# Patient Record
Sex: FEMALE | Race: BLACK OR AFRICAN AMERICAN | NOT HISPANIC OR LATINO | Employment: OTHER | URBAN - METROPOLITAN AREA
[De-identification: names, ages, dates, MRNs, and addresses within clinical notes are randomized per-mention and may not be internally consistent; named-entity substitution may affect disease eponyms.]

---

## 2023-02-18 RX ORDER — DILTIAZEM HYDROCHLORIDE 240 MG/1
CAPSULE, COATED, EXTENDED RELEASE ORAL
COMMUNITY
Start: 2022-05-24

## 2023-02-18 RX ORDER — FERROUS SULFATE 325(65) MG
TABLET ORAL
COMMUNITY

## 2023-02-18 RX ORDER — ATORVASTATIN CALCIUM 40 MG/1
TABLET, FILM COATED ORAL
COMMUNITY

## 2023-02-18 RX ORDER — POTASSIUM CHLORIDE 1.5 G/1.58G
20 POWDER, FOR SOLUTION ORAL ONCE
COMMUNITY
End: 2024-03-19

## 2023-02-18 RX ORDER — VIT B COMP NO.3/FOLIC/C/BIOTIN 1 MG-60 MG
1 TABLET ORAL
COMMUNITY

## 2023-02-18 RX ORDER — HYDRALAZINE HYDROCHLORIDE 100 MG/1
TABLET, FILM COATED ORAL
COMMUNITY
Start: 2021-11-09 | End: 2024-03-19

## 2023-02-18 RX ORDER — CARVEDILOL 25 MG/1
TABLET ORAL
COMMUNITY

## 2023-02-18 RX ORDER — ASPIRIN 81 MG/1
81 TABLET ORAL
COMMUNITY

## 2023-02-18 RX ORDER — ISOSORBIDE MONONITRATE 60 MG/1
TABLET, EXTENDED RELEASE ORAL
COMMUNITY

## 2023-02-22 NOTE — DISCHARGE INSTRUCTIONS
"Patient Education       Arteriovenous Fistula for Dialysis Discharge Instructions     About this topic   Patients with kidney failure are treated with dialysis. A special machine that acts like a kidney is used to wash harmful wastes from your blood. "Cleaned" blood then flows back into your body. This machine is hooked up to a vein in your body.  Doctors need access to your blood to do dialysis. One way for them to do this is by sewing an artery and a vein together to make a fistula. The medical name for this is an arteriovenous or AV fistula. Since it may take a few months for the fistula to heal and get stronger, surgery will be needed weeks to months before using this for dialysis.     What care is needed at home?   Do not wear anything tight on the fistula arm like watches, tight sleeves, or bracelets.  Take extra care not to hit your fistula arm on doorways, furniture, or other heavy, solid objects.  Do not carry anything heavy on the fistula arm like purses, bags, or children.  Do not let anyone except a dialysis nurse draw blood from your fistula arm.  Do not let anyone start an I.V. on your fistula arm.  Do not let anyone take a blood pressure on your fistula arm.  Wear your sling until the block wears off tomorrow.  Keep surgical extremity elevated.    What follow-up care is needed?   Be sure to keep your follow-up appointment.  You will be closely watched by healthcare staff during your treatments. Your fistula will be checked at each visit before dialysis is started.  A dialysis nurse will check for:  Signs of infection including warmth or redness of the skin, swelling of the arm with the fistula, drainage or open sores on the fistula arm  Blood flow ? The dialysis nurse will feel your arm for the vibration or "thrill". This happens as blood flows through the fistula. The nurse will use a stethoscope to listen to the sound of the blood going through the fistula. This is called the bruit.    What problems " "could happen?   Infection  Blood clot that limits blood flow. If blood clots happen often, you may need a new fistula or the clots may have to be removed.  Drainage lasts longer than 48 hours  Coldness and pain in the hand or arm (needs care right away)  Numbness in the fingers  Swelling or bulge at the access site  Fistula does not work right  Fistula may fail to develop or mature  Bleeding    When do I need to call the doctor?   Signs of infection. These include a fever of 100.4°F (38°C) or higher, chills.  Signs of wound infection. These include swelling, redness, warmth around the wound; too much pain when touched; yellowish, greenish, or bloody discharge; foul smell coming from the cut site; cut site opens up.  Pain at the site  Coldness or pain in the hand or arm  Bleeding from your fistula that does not stop after 20 minutes of gentle pressure  You do not feel the vibration or "thrill" as blood flows through the fistula   "

## 2023-02-27 ENCOUNTER — ANESTHESIA (OUTPATIENT)
Dept: SURGERY | Facility: HOSPITAL | Age: 68
End: 2023-02-27
Payer: MEDICARE

## 2023-02-27 ENCOUNTER — ANESTHESIA EVENT (OUTPATIENT)
Dept: SURGERY | Facility: HOSPITAL | Age: 68
End: 2023-02-27
Payer: MEDICARE

## 2023-02-27 ENCOUNTER — HOSPITAL ENCOUNTER (OUTPATIENT)
Facility: HOSPITAL | Age: 68
Discharge: HOME | End: 2023-02-27
Attending: SPECIALIST | Admitting: SPECIALIST
Payer: MEDICARE

## 2023-02-27 DIAGNOSIS — N18.6 END STAGE RENAL DISEASE: Primary | ICD-10-CM

## 2023-02-27 LAB
ANION GAP SERPL CALC-SCNC: 13 MMOL/L (ref 8–16)
BUN SERPL-MCNC: 53 MG/DL (ref 6–30)
CHLORIDE SERPL-SCNC: 103 MMOL/L (ref 95–110)
CREAT SERPL-MCNC: 9.6 MG/DL (ref 0.5–1.4)
GLUCOSE SERPL-MCNC: 91 MG/DL (ref 70–110)
HCT VFR BLD CALC: 29 %PCV (ref 36–54)
HGB BLD-MCNC: 10 G/DL
POC IONIZED CALCIUM: 1.1 MMOL/L (ref 1.06–1.42)
POC TCO2 (MEASURED): 28 MMOL/L (ref 23–29)
POTASSIUM BLD-SCNC: 4 MMOL/L (ref 3.5–5.1)
SAMPLE: ABNORMAL
SODIUM BLD-SCNC: 139 MMOL/L (ref 136–145)

## 2023-02-27 PROCEDURE — 25000003 PHARM REV CODE 250: Performed by: NURSE ANESTHETIST, CERTIFIED REGISTERED

## 2023-02-27 PROCEDURE — 25000003 PHARM REV CODE 250: Performed by: SPECIALIST

## 2023-02-27 PROCEDURE — 63600175 PHARM REV CODE 636 W HCPCS: Performed by: SPECIALIST

## 2023-02-27 PROCEDURE — 25000003 PHARM REV CODE 250: Performed by: ANESTHESIOLOGY

## 2023-02-27 PROCEDURE — C1894 INTRO/SHEATH, NON-LASER: HCPCS | Performed by: SPECIALIST

## 2023-02-27 PROCEDURE — 36000706: Performed by: SPECIALIST

## 2023-02-27 PROCEDURE — 37000008 HC ANESTHESIA 1ST 15 MINUTES: Performed by: SPECIALIST

## 2023-02-27 PROCEDURE — 63600175 PHARM REV CODE 636 W HCPCS: Performed by: ANESTHESIOLOGY

## 2023-02-27 PROCEDURE — 37000009 HC ANESTHESIA EA ADD 15 MINS: Performed by: SPECIALIST

## 2023-02-27 PROCEDURE — 71000015 HC POSTOP RECOV 1ST HR: Performed by: SPECIALIST

## 2023-02-27 PROCEDURE — 36000707: Performed by: SPECIALIST

## 2023-02-27 PROCEDURE — 76942 ECHO GUIDE FOR BIOPSY: CPT | Performed by: ANESTHESIOLOGY

## 2023-02-27 PROCEDURE — 63600175 PHARM REV CODE 636 W HCPCS: Performed by: NURSE ANESTHETIST, CERTIFIED REGISTERED

## 2023-02-27 PROCEDURE — 63600175 PHARM REV CODE 636 W HCPCS

## 2023-02-27 RX ORDER — PROTAMINE SULFATE 10 MG/ML
INJECTION, SOLUTION INTRAVENOUS
Status: DISCONTINUED | OUTPATIENT
Start: 2023-02-27 | End: 2023-02-27

## 2023-02-27 RX ORDER — LIDOCAINE HYDROCHLORIDE 10 MG/ML
INJECTION, SOLUTION EPIDURAL; INFILTRATION; INTRACAUDAL; PERINEURAL
Status: DISCONTINUED | OUTPATIENT
Start: 2023-02-27 | End: 2023-02-27

## 2023-02-27 RX ORDER — DIPHENHYDRAMINE HYDROCHLORIDE 50 MG/ML
25 INJECTION INTRAMUSCULAR; INTRAVENOUS EVERY 6 HOURS PRN
Status: CANCELLED | OUTPATIENT
Start: 2023-02-27

## 2023-02-27 RX ORDER — FENTANYL CITRATE 50 UG/ML
INJECTION, SOLUTION INTRAMUSCULAR; INTRAVENOUS
Status: DISCONTINUED | OUTPATIENT
Start: 2023-02-27 | End: 2023-02-27

## 2023-02-27 RX ORDER — MEPERIDINE HYDROCHLORIDE 25 MG/ML
12.5 INJECTION INTRAMUSCULAR; INTRAVENOUS; SUBCUTANEOUS ONCE
Status: CANCELLED | OUTPATIENT
Start: 2023-02-27 | End: 2023-02-27

## 2023-02-27 RX ORDER — BUPIVACAINE HYDROCHLORIDE 5 MG/ML
INJECTION, SOLUTION EPIDURAL; INTRACAUDAL
Status: DISCONTINUED | OUTPATIENT
Start: 2023-02-27 | End: 2023-02-27 | Stop reason: HOSPADM

## 2023-02-27 RX ORDER — ONDANSETRON 2 MG/ML
4 INJECTION INTRAMUSCULAR; INTRAVENOUS ONCE
Status: CANCELLED | OUTPATIENT
Start: 2023-02-27 | End: 2023-02-27

## 2023-02-27 RX ORDER — CEFAZOLIN SODIUM 1 G/3ML
INJECTION, POWDER, FOR SOLUTION INTRAMUSCULAR; INTRAVENOUS
Status: DISCONTINUED | OUTPATIENT
Start: 2023-02-27 | End: 2023-02-27 | Stop reason: HOSPADM

## 2023-02-27 RX ORDER — IPRATROPIUM BROMIDE AND ALBUTEROL SULFATE 2.5; .5 MG/3ML; MG/3ML
3 SOLUTION RESPIRATORY (INHALATION) ONCE AS NEEDED
Status: CANCELLED | OUTPATIENT
Start: 2023-02-27 | End: 2034-07-26

## 2023-02-27 RX ORDER — FAMOTIDINE 10 MG/ML
20 INJECTION INTRAVENOUS ONCE
Status: COMPLETED | OUTPATIENT
Start: 2023-02-27 | End: 2023-02-27

## 2023-02-27 RX ORDER — HEPARIN SODIUM 5000 [USP'U]/ML
INJECTION, SOLUTION INTRAVENOUS; SUBCUTANEOUS
Status: DISCONTINUED
Start: 2023-02-27 | End: 2023-02-27 | Stop reason: HOSPADM

## 2023-02-27 RX ORDER — HYDROMORPHONE HYDROCHLORIDE 2 MG/ML
0.2 INJECTION, SOLUTION INTRAMUSCULAR; INTRAVENOUS; SUBCUTANEOUS EVERY 5 MIN PRN
Status: CANCELLED | OUTPATIENT
Start: 2023-02-27

## 2023-02-27 RX ORDER — METOCLOPRAMIDE HYDROCHLORIDE 5 MG/ML
10 INJECTION INTRAMUSCULAR; INTRAVENOUS EVERY 10 MIN PRN
Status: CANCELLED | OUTPATIENT
Start: 2023-02-27

## 2023-02-27 RX ORDER — METOCLOPRAMIDE HYDROCHLORIDE 5 MG/ML
10 INJECTION INTRAMUSCULAR; INTRAVENOUS ONCE
Status: COMPLETED | OUTPATIENT
Start: 2023-02-27 | End: 2023-02-27

## 2023-02-27 RX ORDER — PROTAMINE SULFATE 10 MG/ML
INJECTION, SOLUTION INTRAVENOUS
Status: DISCONTINUED
Start: 2023-02-27 | End: 2023-02-27 | Stop reason: HOSPADM

## 2023-02-27 RX ORDER — HYDROCODONE BITARTRATE AND ACETAMINOPHEN 5; 325 MG/1; MG/1
1 TABLET ORAL EVERY 4 HOURS PRN
Status: DISCONTINUED | OUTPATIENT
Start: 2023-02-27 | End: 2023-02-27 | Stop reason: HOSPADM

## 2023-02-27 RX ORDER — LIDOCAINE HYDROCHLORIDE 10 MG/ML
INJECTION INFILTRATION; PERINEURAL
Status: DISCONTINUED
Start: 2023-02-27 | End: 2023-02-27 | Stop reason: HOSPADM

## 2023-02-27 RX ORDER — ROPIVACAINE HYDROCHLORIDE 5 MG/ML
INJECTION, SOLUTION EPIDURAL; INFILTRATION; PERINEURAL
Status: COMPLETED
Start: 2023-02-27 | End: 2023-02-27

## 2023-02-27 RX ORDER — SODIUM CHLORIDE 9 MG/ML
120 INJECTION, SOLUTION INTRAVENOUS CONTINUOUS
Status: DISCONTINUED | OUTPATIENT
Start: 2023-02-27 | End: 2023-02-27 | Stop reason: HOSPADM

## 2023-02-27 RX ORDER — ROPIVACAINE HYDROCHLORIDE 5 MG/ML
INJECTION, SOLUTION EPIDURAL; INFILTRATION; PERINEURAL
Status: COMPLETED | OUTPATIENT
Start: 2023-02-27 | End: 2023-02-27

## 2023-02-27 RX ORDER — MIDAZOLAM HYDROCHLORIDE 1 MG/ML
2 INJECTION INTRAMUSCULAR; INTRAVENOUS ONCE AS NEEDED
Status: COMPLETED | OUTPATIENT
Start: 2023-02-27 | End: 2023-02-27

## 2023-02-27 RX ORDER — SODIUM CHLORIDE 9 MG/ML
INJECTION, SOLUTION INTRAVENOUS CONTINUOUS
Status: DISCONTINUED | OUTPATIENT
Start: 2023-02-27 | End: 2023-02-27 | Stop reason: HOSPADM

## 2023-02-27 RX ORDER — BUPIVACAINE HYDROCHLORIDE 5 MG/ML
INJECTION, SOLUTION EPIDURAL; INTRACAUDAL
Status: DISCONTINUED
Start: 2023-02-27 | End: 2023-02-27 | Stop reason: HOSPADM

## 2023-02-27 RX ORDER — MIDAZOLAM HYDROCHLORIDE 1 MG/ML
INJECTION INTRAMUSCULAR; INTRAVENOUS
Status: COMPLETED
Start: 2023-02-27 | End: 2023-02-27

## 2023-02-27 RX ORDER — ONDANSETRON 2 MG/ML
INJECTION INTRAMUSCULAR; INTRAVENOUS
Status: DISCONTINUED | OUTPATIENT
Start: 2023-02-27 | End: 2023-02-27

## 2023-02-27 RX ORDER — LIDOCAINE HYDROCHLORIDE 10 MG/ML
INJECTION, SOLUTION EPIDURAL; INFILTRATION; INTRACAUDAL; PERINEURAL
Status: DISCONTINUED | OUTPATIENT
Start: 2023-02-27 | End: 2023-02-27 | Stop reason: HOSPADM

## 2023-02-27 RX ORDER — HEPARIN SODIUM 1000 [USP'U]/ML
INJECTION, SOLUTION INTRAVENOUS; SUBCUTANEOUS
Status: DISCONTINUED | OUTPATIENT
Start: 2023-02-27 | End: 2023-02-27

## 2023-02-27 RX ORDER — SODIUM CHLORIDE 9 MG/ML
INJECTION, SOLUTION INTRAMUSCULAR; INTRAVENOUS; SUBCUTANEOUS
Status: DISCONTINUED
Start: 2023-02-27 | End: 2023-02-27 | Stop reason: HOSPADM

## 2023-02-27 RX ORDER — LIDOCAINE HYDROCHLORIDE 10 MG/ML
1 INJECTION, SOLUTION EPIDURAL; INFILTRATION; INTRACAUDAL; PERINEURAL ONCE
Status: DISCONTINUED | OUTPATIENT
Start: 2023-02-27 | End: 2023-02-27 | Stop reason: HOSPADM

## 2023-02-27 RX ORDER — CEFAZOLIN SODIUM 1 G/3ML
2 INJECTION, POWDER, FOR SOLUTION INTRAMUSCULAR; INTRAVENOUS
Status: COMPLETED | OUTPATIENT
Start: 2023-02-27 | End: 2023-02-27

## 2023-02-27 RX ORDER — PROPOFOL 10 MG/ML
VIAL (ML) INTRAVENOUS
Status: DISCONTINUED | OUTPATIENT
Start: 2023-02-27 | End: 2023-02-27

## 2023-02-27 RX ORDER — PROPOFOL 10 MG/ML
VIAL (ML) INTRAVENOUS CONTINUOUS PRN
Status: DISCONTINUED | OUTPATIENT
Start: 2023-02-27 | End: 2023-02-27

## 2023-02-27 RX ORDER — HEPARIN SODIUM 5000 [USP'U]/ML
INJECTION, SOLUTION INTRAVENOUS; SUBCUTANEOUS
Status: DISCONTINUED | OUTPATIENT
Start: 2023-02-27 | End: 2023-02-27 | Stop reason: HOSPADM

## 2023-02-27 RX ORDER — CEFAZOLIN 2 G/1
INJECTION, POWDER, FOR SOLUTION INTRAMUSCULAR; INTRAVENOUS
Status: COMPLETED
Start: 2023-02-27 | End: 2023-02-27

## 2023-02-27 RX ORDER — CEFAZOLIN SODIUM 1 G/3ML
INJECTION, POWDER, FOR SOLUTION INTRAMUSCULAR; INTRAVENOUS
Status: DISCONTINUED
Start: 2023-02-27 | End: 2023-02-27 | Stop reason: HOSPADM

## 2023-02-27 RX ORDER — ACETAMINOPHEN 500 MG
1000 TABLET ORAL ONCE
Status: COMPLETED | OUTPATIENT
Start: 2023-02-27 | End: 2023-02-27

## 2023-02-27 RX ORDER — PROTAMINE SULFATE 10 MG/ML
INJECTION, SOLUTION INTRAVENOUS
Status: DISCONTINUED | OUTPATIENT
Start: 2023-02-27 | End: 2023-02-27 | Stop reason: HOSPADM

## 2023-02-27 RX ADMIN — SODIUM CHLORIDE: 9 INJECTION, SOLUTION INTRAVENOUS at 10:02

## 2023-02-27 RX ADMIN — HEPARIN SODIUM 4000 UNITS: 1000 INJECTION, SOLUTION INTRAVENOUS; SUBCUTANEOUS at 01:02

## 2023-02-27 RX ADMIN — PROTAMINE SULFATE 25 MG: 10 INJECTION, SOLUTION INTRAVENOUS at 01:02

## 2023-02-27 RX ADMIN — PROPOFOL 30 MG: 10 INJECTION, EMULSION INTRAVENOUS at 12:02

## 2023-02-27 RX ADMIN — ROPIVACAINE HYDROCHLORIDE 30 ML: 5 INJECTION, SOLUTION EPIDURAL; INFILTRATION; PERINEURAL at 11:02

## 2023-02-27 RX ADMIN — LIDOCAINE HYDROCHLORIDE 50 MG: 10 INJECTION, SOLUTION EPIDURAL; INFILTRATION; INTRACAUDAL; PERINEURAL at 12:02

## 2023-02-27 RX ADMIN — PROPOFOL 50 MCG/KG/MIN: 10 INJECTION, EMULSION INTRAVENOUS at 12:02

## 2023-02-27 RX ADMIN — METOCLOPRAMIDE 10 MG: 5 INJECTION, SOLUTION INTRAMUSCULAR; INTRAVENOUS at 10:02

## 2023-02-27 RX ADMIN — FENTANYL CITRATE 50 MCG: 50 INJECTION, SOLUTION INTRAMUSCULAR; INTRAVENOUS at 12:02

## 2023-02-27 RX ADMIN — ONDANSETRON 4 MG: 2 INJECTION INTRAMUSCULAR; INTRAVENOUS at 01:02

## 2023-02-27 RX ADMIN — FAMOTIDINE 20 MG: 10 INJECTION, SOLUTION INTRAVENOUS at 10:02

## 2023-02-27 RX ADMIN — MIDAZOLAM HYDROCHLORIDE 2 MG: 1 INJECTION INTRAMUSCULAR; INTRAVENOUS at 11:02

## 2023-02-27 RX ADMIN — MIDAZOLAM HYDROCHLORIDE 2 MG: 1 INJECTION, SOLUTION INTRAMUSCULAR; INTRAVENOUS at 11:02

## 2023-02-27 RX ADMIN — CEFAZOLIN 2 G: 330 INJECTION, POWDER, FOR SOLUTION INTRAMUSCULAR; INTRAVENOUS at 12:02

## 2023-02-27 RX ADMIN — ACETAMINOPHEN 1000 MG: 500 TABLET ORAL at 10:02

## 2023-02-27 NOTE — ANESTHESIA POSTPROCEDURE EVALUATION
Anesthesia Post Evaluation    Patient: Jeane Pelaez    Procedure(s) Performed: Procedure(s) (LRB):  CREATION, AV FISTULA Left basiclic transposition / supra clavicular block (Left)    Final Anesthesia Type: MAC      Patient location during evaluation: PACU  Patient participation: Yes- Able to Participate  Level of consciousness: lethargic  Post-procedure vital signs: reviewed and stable  Pain management: adequate  Airway patency: patent    PONV status at discharge: No PONV  Anesthetic complications: no      Cardiovascular status: blood pressure returned to baseline and stable  Respiratory status: unassisted  Hydration status: euvolemic  Follow-up not needed.              No case tracking events are documented in the log.      Pain/Trey Score: Pain Rating Prior to Med Admin: 0 (2/27/2023 10:24 AM)

## 2023-02-27 NOTE — DISCHARGE SUMMARY
[facility] Surgical - Periop Services  Discharge Note  Short Stay    Procedure(s) (LRB):  CREATION, AV FISTULA Left basiclic transposition / supra clavicular block (Left)      OUTCOME: Patient tolerated treatment/procedure well without complication and is now ready for discharge.    DISPOSITION: Home or Self Care    FINAL DIAGNOSIS:  End stage renal disease    FOLLOWUP: In clinic    DISCHARGE INSTRUCTIONS:    Discharge Procedure Orders   Diet general     Keep surgical extremity elevated     Wound care routine (specify)   Order Comments: Leave dermabond in place until it falls off;  Ok to wash with soap under running water but do not submerge.  Do not use antibiotics ointment.     Call MD for:  temperature >100.4     Call MD for:  redness, tenderness, or signs of infection (pain, swelling, redness, odor or green/yellow discharge around incision site)     Activity as tolerated        TIME SPENT ON DISCHARGE: 5 minutes

## 2023-02-27 NOTE — OP NOTE
Surgeon: Cheko Jean Baptiste MD    Date: 02/27/2023     Diagnosis: Pre-Op Diagnosis Codes:     * End stage renal disease [N18.6]     Procedure:  Left Basilic fistula creation    History of Presenting Illness: Ms. Pelaez is a 67 y.o. year old female who has end stage renal disease and needs long term hemodialysis access.      Procedure:  The patient was taken to the operating room and placed in a supine position.  The Left arm was prepped and draped in the usual sterile fashion.  Antibiotics were administered and appropriate time-out was performed.  B-mode ultrasound was performed on the extremity.  The upper arm basilic vein was identified and the brachial artery was identified.  Both appeared appropriate for fistula creation.  Incision was made over the course of the basilic vein.  Dissection was performed through subcutaneous tissues down to the level of the vessel.  Antecubital brachial nerve was identified and kept free from harm.  Basilic vein was mobilized throughout our upper arm incision and multiple side branches were ligated with 3-0 silk ties and surgical clips.  The distal brachial artery was identified and controlled with vessel loops.  We ligated the basilic vein distally with a 2-0 silk suture.  The vein easily accepted a 4 dilator. The vein was then pressurized and tested.  We heparinized the patient and tunneling was performed.  The vein was brought through this tunnel and then we performed an end-to-side anastomosis between the brachial artery and basilic vein.  This was performed with a running 6 0 Prolene suture.  Flow was restored through the artery into the fistula.  There was a thrill in the fistula and a palpable radial pulse.  Protamine was administered and hemostasis was obtained.  Further dissection was performed to avoid tension on the fistula.  The wound was closed with 3-0 Vicryl suture and 4-0 Monocryl suture.  Dermabond was applied.    Complications:  none    Findings: Reasonable vein  size.  Good thrill to fistula.  Palpable radial pulse.

## 2023-02-27 NOTE — ANESTHESIA PROCEDURE NOTES
Supraclavicular Block    Patient location during procedure: pre-op   Block not for primary anesthetic.  Reason for block: at surgeon's request and post-op pain management   Post-op Pain Location: Left Arm   Start time: 2/27/2023 11:03 AM  Timeout: 2/27/2023 11:03 AM   End time: 2/27/2023 11:08 AM    Staffing  Authorizing Provider: James Mcconnell DO  Performing Provider: James Mcconnell DO    Preanesthetic Checklist  Completed: patient identified, IV checked, site marked, risks and benefits discussed, surgical consent, monitors and equipment checked, pre-op evaluation and timeout performed  Peripheral Block  Patient position: supine  Prep: ChloraPrep  Patient monitoring: heart rate, cardiac monitor, continuous pulse ox and frequent blood pressure checks  Block type: supraclavicular  Laterality: left  Injection technique: single shot  Needle  Needle type: Stimuplex   Needle gauge: 22 G  Needle length: 4 in  Needle localization: anatomical landmarks, nerve stimulator and ultrasound guidance   -ultrasound image captured on disc.  Assessment  Injection assessment: negative aspiration, negative parasthesia and local visualized surrounding nerve  Heart rate change: no  Slow fractionated injection: yes  Pain Tolerance: comfortable throughout block and no complaints  Medications:    Medications: ropivacaine (NAROPIN) injection 0.5% - Perineural   30 mL - 2/27/2023 11:08:00 AM    Additional Notes  VSS, patient tolerated procedure will.

## 2023-02-28 VITALS
BODY MASS INDEX: 24.84 KG/M2 | HEIGHT: 64 IN | OXYGEN SATURATION: 100 % | SYSTOLIC BLOOD PRESSURE: 146 MMHG | TEMPERATURE: 98 F | HEART RATE: 55 BPM | DIASTOLIC BLOOD PRESSURE: 90 MMHG | RESPIRATION RATE: 16 BRPM | WEIGHT: 145.5 LBS

## 2023-04-19 RX ORDER — HYDROCODONE BITARTRATE AND ACETAMINOPHEN 7.5; 325 MG/1; MG/1
TABLET ORAL
COMMUNITY
End: 2024-03-19

## 2023-05-01 NOTE — PROGRESS NOTES
[facility] Vascular - Clinic Note  Cheko Jean Baptiste MD      Patient Name: Jeane Pelaez     MRN: [id number]   Visit Date: 05/02/2023    Patient Care Team:  HELEN Vidales as PCP - General (Family Medicine)    Subjective:           Hemodialysis Access      HPI: Ms. Pelaez presents to the clinic for 6 week follow up s/p left basilic fistula creation 2/27/23. She denies signs or symptoms of infection to her left upper arm. She denies left hand numbness or pain since surgery.     She is currently on peritoneal dialysis.       Past Medical History:   Diagnosis Date    Heart palpitations     Hypertension     Mixed hyperlipidemia     Renal disorder      Past Surgical History:   Procedure Laterality Date    AV FISTULA PLACEMENT Left 2/27/2023    Procedure: CREATION, AV FISTULA Left basiclic transposition / supra clavicular block;  Surgeon: Cheko Jean Baptiste MD;  Location: [facility];  Service: Peripheral Vascular;  Laterality: Left;    CARDIAC SURGERY      ablation    HYSTERECTOMY      OOPHORECTOMY      PERITONEAL CATHETER INSERTION Right     right knee scope Right      Family History   Problem Relation Age of Onset    Hypertension Mother     Kidney disease Mother     Kidney disease Maternal Grandmother     Parkinsonism Maternal Grandmother      Social History     Socioeconomic History    Marital status:    Tobacco Use    Smoking status: Never    Smokeless tobacco: Never   Substance and Sexual Activity    Alcohol use: Not Currently    Drug use: Never     Current Outpatient Medications   Medication Instructions    aspirin (ECOTRIN) 81 mg, Oral    atorvastatin (LIPITOR) 40 MG tablet atorvastatin 40 mg tablet    carvediloL (COREG) 25 MG tablet carvedilol 25 mg tablet    diltiaZEM (CARDIZEM CD) 240 MG 24 hr capsule 1 capsule    epoetin siena-epbx (RETACRIT) 20,000 unit/mL injection Retacrit 20,000 unit/mL injection solution   INJECT 1 VIAL UNDER THE SKIN EVERY 2 WEEKS    ferrous sulfate (FEOSOL) 325 mg (65 mg iron)  "Tab tablet ferrous sulfate 325 mg (65 mg iron) tablet   TAKE ONE Tablet BY MOUTH ONCE DAILY    hydrALAZINE (APRESOLINE) 100 MG tablet hydralazine 100 mg tablet   TAKE ONE Tablet BY MOUTH THREE TIMES DAILY WITH FOOD    HYDROcodone-acetaminophen (NORCO) 7.5-325 mg per tablet hydrocodone 7.5 mg-acetaminophen 325 mg tablet   Take 1 tablet every 4 hours by oral route as needed.    isosorbide mononitrate (IMDUR) 60 MG 24 hr tablet isosorbide mononitrate ER 60 mg tablet,extended release 24 hr    potassium chloride (KLOR-CON) 20 mEq Pack 20 mEq, Oral, Once, Taking every other day    vit b cmplx 3-fa-vit c-biotin 1- mg-mg-mcg (AFUA-KEREN RX) 1- mg-mg-mcg Tab 1 tablet     Review of patient's allergies indicates:   Allergen Reactions    Codeine Nausea And Vomiting, Nausea Only and Other (See Comments)    Fentanyl      Fentanyl and Morphine post-op caused convulsions    Morphine Other (See Comments)     "Fentanyl and Morphine post-op caused convulsions"      Amoxicillin Other (See Comments)    Milk      Sinus problems           REVIEW OF SYSTEMS:  Review of Systems   All other systems reviewed and are negative.  12 point review of systems conducted, negative except as stated in the history of present illness. See HPI for details.      Objective:     PHYSICAL EXAM:   Visit Vitals  BP (!) 154/86 (BP Location: Right arm)   Pulse 79   Ht 5' 4" (1.626 m)   Wt 65.8 kg (145 lb)   BMI 24.89 kg/m²       Vascular Physical Exam  Vitals and nursing note reviewed.   Constitutional:       General: She is not in acute distress.  HENT:      Head: Normocephalic.      Nose: Nose normal.   Cardiovascular:      Rate and Rhythm: Normal rate and regular rhythm.      Pulses:           Radial pulses are 2+ on the left side.   Abdominal:      General: There is no distension.      Tenderness: There is no abdominal tenderness.   Musculoskeletal:      Right lower leg: No edema.      Left lower leg: No edema.   Lymphadenopathy:      Cervical: No " cervical adenopathy.   Neurological:      General: No focal deficit present.      Mental Status: She is alert.      Cranial Nerves: No cranial nerve deficit.   Psychiatric:         Mood and Affect: Mood normal.   Arteriovenous access:     Left arteriovenous access is present.        Left Access: Surgical AVF location(s): upper arm basilic vein. Upper arm basilic vein has thrill. Character of thrill is without pulsatility.      Post Operative Incision:    Location: left upper arm   Inspection: well approximated and healing well       Imaging Obtained/Reviewed:   Study: sonsosite imaging  Date: 5/2/23        Assessment/Plan:     Ms. Pelaez is a 67 y.o. with with end stage renal disease (ESRD) on dialysis who is s/p left brachiobasilic fistula creation on 2/27/23. On exam 2/27/23. Sonosite imaging obtained today shows fistula is maturing well with slightly small diameters through midportion (about 5mm). The access would benefit from continued time to achieve maturity. Follow up in 3-4 months to continue surveillance.     Ok to use fistula in two weeks.     1. ESRD (end stage renal disease)         No follow-ups on file. In addition to their scheduled follow up, the patient has also been instructed to follow up on as needed basis.     No future appointments.

## 2023-05-02 ENCOUNTER — OFFICE VISIT (OUTPATIENT)
Dept: VASCULAR SURGERY | Facility: CLINIC | Age: 68
End: 2023-05-02
Payer: MEDICARE

## 2023-05-02 VITALS
HEIGHT: 64 IN | WEIGHT: 145 LBS | DIASTOLIC BLOOD PRESSURE: 86 MMHG | SYSTOLIC BLOOD PRESSURE: 154 MMHG | BODY MASS INDEX: 24.75 KG/M2 | HEART RATE: 79 BPM

## 2023-05-02 DIAGNOSIS — N18.6 ESRD (END STAGE RENAL DISEASE): Primary | ICD-10-CM

## 2023-05-02 PROCEDURE — 3288F FALL RISK ASSESSMENT DOCD: CPT | Mod: CPTII,,, | Performed by: SPECIALIST

## 2023-05-02 PROCEDURE — 3288F PR FALLS RISK ASSESSMENT DOCUMENTED: ICD-10-PCS | Mod: CPTII,,, | Performed by: SPECIALIST

## 2023-05-02 PROCEDURE — 3077F PR MOST RECENT SYSTOLIC BLOOD PRESSURE >= 140 MM HG: ICD-10-PCS | Mod: CPTII,,, | Performed by: SPECIALIST

## 2023-05-02 PROCEDURE — 1160F RVW MEDS BY RX/DR IN RCRD: CPT | Mod: CPTII,,, | Performed by: SPECIALIST

## 2023-05-02 PROCEDURE — 3077F SYST BP >= 140 MM HG: CPT | Mod: CPTII,,, | Performed by: SPECIALIST

## 2023-05-02 PROCEDURE — 1159F MED LIST DOCD IN RCRD: CPT | Mod: CPTII,,, | Performed by: SPECIALIST

## 2023-05-02 PROCEDURE — 3008F BODY MASS INDEX DOCD: CPT | Mod: CPTII,,, | Performed by: SPECIALIST

## 2023-05-02 PROCEDURE — 1160F PR REVIEW ALL MEDS BY PRESCRIBER/CLIN PHARMACIST DOCUMENTED: ICD-10-PCS | Mod: CPTII,,, | Performed by: SPECIALIST

## 2023-05-02 PROCEDURE — 1101F PR PT FALLS ASSESS DOC 0-1 FALLS W/OUT INJ PAST YR: ICD-10-PCS | Mod: CPTII,,, | Performed by: SPECIALIST

## 2023-05-02 PROCEDURE — 3008F PR BODY MASS INDEX (BMI) DOCUMENTED: ICD-10-PCS | Mod: CPTII,,, | Performed by: SPECIALIST

## 2023-05-02 PROCEDURE — 3079F DIAST BP 80-89 MM HG: CPT | Mod: CPTII,,, | Performed by: SPECIALIST

## 2023-05-02 PROCEDURE — 3079F PR MOST RECENT DIASTOLIC BLOOD PRESSURE 80-89 MM HG: ICD-10-PCS | Mod: CPTII,,, | Performed by: SPECIALIST

## 2023-05-02 PROCEDURE — 1126F AMNT PAIN NOTED NONE PRSNT: CPT | Mod: CPTII,,, | Performed by: SPECIALIST

## 2023-05-02 PROCEDURE — 1126F PR PAIN SEVERITY QUANTIFIED, NO PAIN PRESENT: ICD-10-PCS | Mod: CPTII,,, | Performed by: SPECIALIST

## 2023-05-02 PROCEDURE — 99024 POSTOP FOLLOW-UP VISIT: CPT | Mod: ,,, | Performed by: SPECIALIST

## 2023-05-02 PROCEDURE — 99024 PR POST-OP FOLLOW-UP VISIT: ICD-10-PCS | Mod: ,,, | Performed by: SPECIALIST

## 2023-05-02 PROCEDURE — 1101F PT FALLS ASSESS-DOCD LE1/YR: CPT | Mod: CPTII,,, | Performed by: SPECIALIST

## 2023-05-02 PROCEDURE — 1159F PR MEDICATION LIST DOCUMENTED IN MEDICAL RECORD: ICD-10-PCS | Mod: CPTII,,, | Performed by: SPECIALIST

## 2023-08-07 NOTE — PROGRESS NOTES
[facility] Vascular - Clinic Note  Cheko Jean Baptiste MD      Patient Name: Jeane Pelaez                   : 1955     MRN: [id number]   Visit Date: 2023          History Present Illness     Reason for Visit: Hemodialysis Access      Ms. Pelaez presents to the clinic for left arm fistula surveillance. She is not on hemodialysis at this time, currently undergoing peritoneal dialysis. She denies left arm swelling or hand pain/fatigue. Denies recent health changes or hospitalizations  .       REVIEW OF SYSTEMS:  ROS  12 point review of systems conducted, negative except as stated in the history of present illness. See HPI for details.        Physical Exam      Visit Vitals  /76 (BP Location: Right arm)   Pulse 85       General: well-nourished, no acute distress, and healthy appearing, ambulating normally, alert, pleasant, conversant, and oriented  Neurologic: cranial nerves are grossly intact, no neurologic deficits  HEENT: grossly normal and no scleral icterus  Neck/Chest: normal  and soft without lymphadenopathy  Respiratory: breathing easily and without respiratory distress  Abdomen: normal and soft  Cardiology: regular rate and rhythm    Upper Extremity Arterial Exam:   Right - radial is palpable  Left - radial is palpable    Dialysis Access:  left brachiobasilic fistula and peritoneal dialysis  Assessment: good thrill with good size    Musculoskeletal:   Upper Extremity: normal left hand function and normal left hand sensation, left hand is warm   Lower Extremity: no edema present to bilateral lower extremities              Assessment and Plan     Ms. Pelaez is a 67 y.o. with end stage renal failure on peritoneal dialysis presenting for 3 months month surveillance of her left basilic fistula. On exam her access remains patent and suitable for cannulation (good size, good depth and good cannulation zone length). Recommend continued surveillance of access in 6 months, but will follow on an as  needed basis if hemodialysis is initiated. She is in agreement with the plan.        1. ESRD (end stage renal disease)           Imaging Obtained/Reviewed   Study: none  Date:              Medical History     Past Medical History:   Diagnosis Date    Heart palpitations     Hypertension     Mixed hyperlipidemia     Renal disorder      Past Surgical History:   Procedure Laterality Date    AV FISTULA PLACEMENT Left 2/27/2023    Procedure: CREATION, AV FISTULA Left basiclic transposition / supra clavicular block;  Surgeon: Cheko Jean Baptiste MD;  Location: [facility] OR;  Service: Peripheral Vascular;  Laterality: Left;    CARDIAC SURGERY      ablation    HYSTERECTOMY      OOPHORECTOMY      PERITONEAL CATHETER INSERTION Right     right knee scope Right      Family History   Problem Relation Age of Onset    Hypertension Mother     Kidney disease Mother     Kidney disease Maternal Grandmother     Parkinsonism Maternal Grandmother      Social History     Socioeconomic History    Marital status:    Tobacco Use    Smoking status: Never    Smokeless tobacco: Never   Substance and Sexual Activity    Alcohol use: Not Currently    Drug use: Never     Current Outpatient Medications   Medication Instructions    aspirin (ECOTRIN) 81 mg, Oral    atorvastatin (LIPITOR) 40 MG tablet atorvastatin 40 mg tablet    carvediloL (COREG) 25 MG tablet carvedilol 25 mg tablet    diltiaZEM (CARDIZEM CD) 240 MG 24 hr capsule 1 capsule    epoetin siena-epbx (RETACRIT) 20,000 unit/mL injection Retacrit 20,000 unit/mL injection solution   INJECT 1 VIAL UNDER THE SKIN EVERY 2 WEEKS    ferrous sulfate (FEOSOL) 325 mg (65 mg iron) Tab tablet ferrous sulfate 325 mg (65 mg iron) tablet   TAKE ONE Tablet BY MOUTH ONCE DAILY    hydrALAZINE (APRESOLINE) 100 MG tablet hydralazine 100 mg tablet   TAKE ONE Tablet BY MOUTH THREE TIMES DAILY WITH FOOD    HYDROcodone-acetaminophen (NORCO) 7.5-325 mg per tablet hydrocodone 7.5 mg-acetaminophen 325 mg tablet    "Take 1 tablet every 4 hours by oral route as needed.    isosorbide mononitrate (IMDUR) 60 MG 24 hr tablet isosorbide mononitrate ER 60 mg tablet,extended release 24 hr    potassium chloride (KLOR-CON) 20 mEq Pack 20 mEq, Oral, Once, Taking every other day    vit b cmplx 3-fa-vit c-biotin 1- mg-mg-mcg (AFUA-KEREN RX) 1- mg-mg-mcg Tab 1 tablet    voriconazole (VFEND) 200 MG Tab Oral     Review of patient's allergies indicates:   Allergen Reactions    Codeine Nausea And Vomiting, Nausea Only and Other (See Comments)    Fentanyl      Fentanyl and Morphine post-op caused convulsions    Morphine Other (See Comments)     "Fentanyl and Morphine post-op caused convulsions"      Amoxicillin Other (See Comments)    Milk      Sinus problems       Patient Care Team:  Dorothy Greenwood FNP as PCP - General (Family Medicine)  Nuris Sheffield MD (Nephrology)      No follow-ups on file. In addition to their scheduled follow up, the patient has also been instructed to follow up on as needed basis.     Future Appointments   Date Time Provider Department Center   8/23/2023 10:30 AM [facility] PIW [id number] [facility] PW KWAME Part in [place]          "

## 2023-08-08 ENCOUNTER — OFFICE VISIT (OUTPATIENT)
Dept: VASCULAR SURGERY | Facility: CLINIC | Age: 68
End: 2023-08-08
Payer: MEDICARE

## 2023-08-08 VITALS — DIASTOLIC BLOOD PRESSURE: 76 MMHG | SYSTOLIC BLOOD PRESSURE: 124 MMHG | HEART RATE: 85 BPM

## 2023-08-08 DIAGNOSIS — N18.6 ESRD (END STAGE RENAL DISEASE): Primary | ICD-10-CM

## 2023-08-08 PROCEDURE — 1101F PT FALLS ASSESS-DOCD LE1/YR: CPT | Mod: CPTII,,, | Performed by: SPECIALIST

## 2023-08-08 PROCEDURE — 1159F MED LIST DOCD IN RCRD: CPT | Mod: CPTII,,, | Performed by: SPECIALIST

## 2023-08-08 PROCEDURE — 3288F FALL RISK ASSESSMENT DOCD: CPT | Mod: CPTII,,, | Performed by: SPECIALIST

## 2023-08-08 PROCEDURE — 1159F PR MEDICATION LIST DOCUMENTED IN MEDICAL RECORD: ICD-10-PCS | Mod: CPTII,,, | Performed by: SPECIALIST

## 2023-08-08 PROCEDURE — 99213 OFFICE O/P EST LOW 20 MIN: CPT | Mod: ,,, | Performed by: SPECIALIST

## 2023-08-08 PROCEDURE — 1160F PR REVIEW ALL MEDS BY PRESCRIBER/CLIN PHARMACIST DOCUMENTED: ICD-10-PCS | Mod: CPTII,,, | Performed by: SPECIALIST

## 2023-08-08 PROCEDURE — 3074F PR MOST RECENT SYSTOLIC BLOOD PRESSURE < 130 MM HG: ICD-10-PCS | Mod: CPTII,,, | Performed by: SPECIALIST

## 2023-08-08 PROCEDURE — 99213 PR OFFICE/OUTPT VISIT, EST, LEVL III, 20-29 MIN: ICD-10-PCS | Mod: ,,, | Performed by: SPECIALIST

## 2023-08-08 PROCEDURE — 3288F PR FALLS RISK ASSESSMENT DOCUMENTED: ICD-10-PCS | Mod: CPTII,,, | Performed by: SPECIALIST

## 2023-08-08 PROCEDURE — 3078F DIAST BP <80 MM HG: CPT | Mod: CPTII,,, | Performed by: SPECIALIST

## 2023-08-08 PROCEDURE — 3074F SYST BP LT 130 MM HG: CPT | Mod: CPTII,,, | Performed by: SPECIALIST

## 2023-08-08 PROCEDURE — 1101F PR PT FALLS ASSESS DOC 0-1 FALLS W/OUT INJ PAST YR: ICD-10-PCS | Mod: CPTII,,, | Performed by: SPECIALIST

## 2023-08-08 PROCEDURE — 1160F RVW MEDS BY RX/DR IN RCRD: CPT | Mod: CPTII,,, | Performed by: SPECIALIST

## 2023-08-08 PROCEDURE — 3078F PR MOST RECENT DIASTOLIC BLOOD PRESSURE < 80 MM HG: ICD-10-PCS | Mod: CPTII,,, | Performed by: SPECIALIST

## 2023-08-08 RX ORDER — VORICONAZOLE 200 MG/1
TABLET, FILM COATED ORAL
COMMUNITY
Start: 2023-08-07 | End: 2024-03-19

## 2023-10-22 NOTE — TRANSFER OF CARE
Anesthesia Transfer of Care Note    Patient: Jeane Pelaez    Procedure(s) Performed: Procedure(s) (LRB):  CREATION, AV FISTULA Left basiclic transposition / supra clavicular block (Left)    Patient location: PACU    Anesthesia Type: MAC    Transport from OR: Transported from OR on room air with adequate spontaneous ventilation    Post pain: adequate analgesia    Post assessment: no apparent anesthetic complications    Post vital signs: stable    Level of consciousness: lethargic    Nausea/Vomiting: no nausea/vomiting    Complications: none    Transfer of care protocol was followed         Assist RN: CT called regarding pt's images. Per CT tech, possible fracture is observed.  This RN applied rigid c-collar.

## 2024-03-13 NOTE — PROGRESS NOTES
"      [facility] Vascular - Clinic Note  Cheko Jean Baptiste MD      Patient Name: Jeane Pelaez                   : 1955     MRN: [id number]   Visit Date: 3/19/2024          History Present Illness     Reason for Visit: Access Surveillance    Ms. Pelaez presents to the clinic for surveillance of her left basilic fistula. Her access has been in place since 23. She denies left hand/arm pain.     She is remains on peritoneal dialysis without issues.       REVIEW OF SYSTEMS:  12 point review of systems conducted, negative except as stated in the history of present illness. See HPI for details.        Physical Exam      Vitals:    24 1339   BP: 117/75   BP Location: Right arm   Pulse: 83   Weight: 63.5 kg (140 lb)   Height: 5' 4" (1.626 m)        General: well-nourished, no acute distress, and healthy appearing, ambulating normally, alert, pleasant, conversant, and oriented  Neck/Chest: normal  and soft without lymphadenopathy  Respiratory: breathing easily and without respiratory distress  Cardiology: regular rate and rhythm    Upper Extremity Arterial Exam:   Left - radial is palpable    Dialysis Access:  peritoneal dialysis  left brachiobasilic fistula  Assessment: good thrill without pulsatility, good size to the fistula    Musculoskeletal:   Upper Extremity: normal left hand function  Lower Extremity: no edema present to bilateral lower extremities              Assessment and Plan     Ms. Pelaez is a 68 y.o. with end stage renal failure presenting for 6 month surveillance of her left basilic fistula. On exam her left arm access remains patent with a good size and good thrill.  It remains suitable for cannulation. Recommend continued surveillance of access in 1 year, but will follow on an as needed basis if hemodialysis is initiated. She is in agreement with the plan.        1. ESRD (end stage renal disease)           Imaging Obtained/Reviewed   Study: none  Date:              Medical History     Past " Medical History:   Diagnosis Date    A-fib     CAD (coronary artery disease)     CKD (chronic kidney disease), stage V     Heart failure     Heart palpitations     HLD (hyperlipidemia)     Hyperparathyroidism     Hypertension     Polycystic kidney disease     Renal disorder      Past Surgical History:   Procedure Laterality Date    AV FISTULA PLACEMENT Left 02/27/2023    Procedure: CREATION, AV FISTULA Left basiclic transposition / supra clavicular block;  Surgeon: Cheko Jean Baptiste MD;  Location: [facility] OR;  Service: Peripheral Vascular;  Laterality: Left;    CARDIAC SURGERY      ablation    HYSTERECTOMY      KNEE ARTHROSCOPY Right     x2    OOPHORECTOMY      PERITONEAL CATHETER INSERTION Left 06/13/2022    Procedure: Creation Fistula Arterial Venous Shunt; Surgeon: Robinson Eckert MD; Location: [facility] OR; Service: Vascular; Laterality: Left;     Family History   Problem Relation Age of Onset    Hypertension Mother     Kidney disease Mother     Cystic kidney disease Mother     Kidney disease Maternal Grandmother     Parkinsonism Maternal Grandmother      Social History     Socioeconomic History    Marital status:    Tobacco Use    Smoking status: Never    Smokeless tobacco: Never   Substance and Sexual Activity    Alcohol use: Not Currently    Drug use: Never     Current Outpatient Medications   Medication Instructions    aspirin (ECOTRIN) 81 mg, Oral    atorvastatin (LIPITOR) 40 MG tablet atorvastatin 40 mg tablet    carvediloL (COREG) 25 MG tablet carvedilol 25 mg tablet    diltiaZEM (CARDIZEM CD) 240 MG 24 hr capsule 1 capsule    epoetin siena-epbx (RETACRIT) 20,000 unit/mL injection Retacrit 20,000 unit/mL injection solution   INJECT 1 VIAL UNDER THE SKIN EVERY 2 WEEKS    ferrous sulfate (FEOSOL) 325 mg (65 mg iron) Tab tablet ferrous sulfate 325 mg (65 mg iron) tablet   TAKE ONE Tablet BY MOUTH ONCE DAILY    isosorbide mononitrate (IMDUR) 60 MG 24 hr tablet isosorbide mononitrate ER 60 mg  "tablet,extended release 24 hr    vit b cmplx 3-fa-vit c-biotin 1- mg-mg-mcg (AFUA-KEREN RX) 1- mg-mg-mcg Tab 1 tablet     Review of patient's allergies indicates:   Allergen Reactions    Codeine Nausea And Vomiting, Nausea Only and Other (See Comments)    Fentanyl      Fentanyl and Morphine post-op caused convulsions    Morphine Other (See Comments)     "Fentanyl and Morphine post-op caused convulsions"      Amoxicillin Other (See Comments)    Milk      Sinus problems       Patient Care Team:  Dorothy Greenwood FNP as PCP - General (Family Medicine)  Nuris Sheffield MD (Nephrology)  [facility] as Dialysis Nurse      No follow-ups on file. In addition to their scheduled follow up, the patient has also been instructed to follow up on as needed basis.     Future Appointments   Date Time Provider Department Center   3/25/2025  1:40 PM [facility]            "

## 2024-03-19 ENCOUNTER — OFFICE VISIT (OUTPATIENT)
Dept: VASCULAR SURGERY | Facility: CLINIC | Age: 69
End: 2024-03-19
Payer: MEDICARE

## 2024-03-19 VITALS
BODY MASS INDEX: 23.9 KG/M2 | HEART RATE: 83 BPM | DIASTOLIC BLOOD PRESSURE: 75 MMHG | SYSTOLIC BLOOD PRESSURE: 117 MMHG | HEIGHT: 64 IN | WEIGHT: 140 LBS

## 2024-03-19 DIAGNOSIS — N18.6 ESRD (END STAGE RENAL DISEASE): Primary | ICD-10-CM

## 2024-03-19 PROCEDURE — 99213 OFFICE O/P EST LOW 20 MIN: CPT | Mod: ,,, | Performed by: SPECIALIST

## 2025-04-24 RX ORDER — DULOXETIN HYDROCHLORIDE 20 MG/1
1 CAPSULE, DELAYED RELEASE ORAL DAILY
COMMUNITY
Start: 2024-08-13

## 2025-04-24 RX ORDER — SEVELAMER CARBONATE 2.4 G/1
1 POWDER, FOR SUSPENSION ORAL
COMMUNITY
Start: 2023-10-16 | End: 2026-01-20

## 2025-05-05 NOTE — PROGRESS NOTES
"      [facility] Vascular - Clinic Note  Cheko Jean Baptiste MD      Patient Name: Jeane Pelaez                   : 1955     MRN: [id number]   Visit Date: 2025          History Present Illness     Reason for Visit: Access Surveillance    Ms. Pelaez presents to the clinic  for annual surveillance of her left basilic fistula. Her access has been in place since 23. She denies left hand/arm pain. She is remains on peritoneal dialysis without issues. She states the dialysis unit has only used her fistula for lab work.      REVIEW OF SYSTEMS:  Review of systems conducted, negative except as stated in the history of present illness. See HPI for details.        Physical Exam      Vitals:    25 1418   BP: 134/77   BP Location: Right arm   Patient Position: Sitting   Pulse: 87   Weight: 60.8 kg (134 lb)   Height: 5' 4" (1.626 m)        General: well-nourished, no acute distress, and healthy appearing, ambulating normally, alert, pleasant, conversant, and oriented  Respiratory: breathing easily and without respiratory distress  Cardiology: regular rate and rhythm    Upper Extremity Arterial Exam:   Right - radial is palpable  Left - radial is palpable    Dialysis Access:  peritoneal dialysis  left brachiocephalic fistula  Assessment: good thrill    Musculoskeletal:   Upper Extremity: normal bilateral hand function  Lower Extremity: no edema present to bilateral lower extremities            Assessment and Plan     Ms. Pelaez is a 69 y.o. with end stage renal failure on PD presenting for 1 year surveillance of her left basilic fistula. On exam her access remains patent and suitable for cannulation. Recommend continued surveillance of access in 1 year, but will follow on an as needed basis if hemodialysis is initiated. She is in agreement with the plan       1. ESRD (end stage renal disease)              Medical History     Past Medical History:   Diagnosis Date    A-fib     CAD (coronary artery disease)     " CKD (chronic kidney disease), stage V     ESRD (end stage renal disease)     Heart failure     Heart palpitations     HLD (hyperlipidemia)     Hyperparathyroidism     Hypertension     Polycystic kidney disease     Renal disorder      Past Surgical History:   Procedure Laterality Date    AV FISTULA PLACEMENT Left 02/27/2023    Procedure: CREATION, AV FISTULA Left basiclic transposition / supra clavicular block;  Surgeon: Cheko Jean Baptiste MD;  Location[facility] OR;  Service: Peripheral Vascular;  Laterality: Left;    CARDIAC SURGERY      ablation    HYSTERECTOMY      KNEE ARTHROSCOPY Right     x2    OOPHORECTOMY      PERITONEAL CATHETER INSERTION Left 06/13/2022    Procedure: Creation Fistula Arterial Venous Shunt; Surgeon: Robinson Eckert MD; Location: [facility] OR; Service: Vascular; Laterality: Left;     Family History   Problem Relation Name Age of Onset    Hypertension Mother      Kidney disease Mother      Cystic kidney disease Mother      Kidney disease Maternal Grandmother      Parkinsonism Maternal Grandmother       Social History[1]  Current Outpatient Medications   Medication Instructions    aspirin (ECOTRIN) 81 mg, Oral    atorvastatin (LIPITOR) 40 MG tablet atorvastatin 40 mg tablet    B complex-vitamin C-folic acid (AFUA-KEREN) 0.8 mg Tab TAKE 1 TABLET BY MOUTH ONCE DAILY Oral for 30 Days    carvediloL (COREG) 25 MG tablet carvedilol 25 mg tablet    diltiaZEM (CARDIZEM CD) 240 MG 24 hr capsule 1 capsule    DULoxetine (CYMBALTA) 20 MG capsule 1 capsule, Daily    epoetin siena-epbx (RETACRIT) 20,000 unit/mL injection Retacrit 20,000 unit/mL injection solution   INJECT 1 VIAL UNDER THE SKIN EVERY 2 WEEKS    ferrous sulfate (FEOSOL) 325 mg (65 mg iron) Tab tablet ferrous sulfate 325 mg (65 mg iron) tablet   TAKE ONE Tablet BY MOUTH ONCE DAILY    isosorbide mononitrate (IMDUR) 60 MG 24 hr tablet isosorbide mononitrate ER 60 mg tablet,extended release 24 hr    magnesium chloride 64 mg magnesium Tab 2 tablets  "   sevelamer carbonate (RENVELA) 2.4 gram PwPk 1 packet    vit b cmplx 3-fa-vit c-biotin 1- mg-mg-mcg (AFUA-KEREN RX) 1- mg-mg-mcg Tab 1 tablet     Review of patient's allergies indicates:   Allergen Reactions    Amoxicillin Other (See Comments) and Shortness Of Breath    Codeine Hallucinations, Nausea And Vomiting, Nausea Only and Other (See Comments)     Other Reaction(s): Unknown, vomiting    Other Reaction(s): Unknown    codeine    Fentanyl      Fentanyl and Morphine post-op caused convulsions    Morphine Other (See Comments) and Hallucinations     "Fentanyl and Morphine post-op caused convulsions"    Milk      Sinus problems       Patient Care Team:  Dorothy Greenwood FNP as PCP - General (Family Medicine)  Nuris Sheffield MD (Nephrology)  [facility] (Dialysis Center)  Cheko Jean Baptiste MD as Vascular Surgery (Vascular Surgery)      No follow-ups on file. In addition to their scheduled follow up, the patient has also been instructed to follow up on as needed basis.     No future appointments.              [1]   Social History  Socioeconomic History    Marital status:    Tobacco Use    Smoking status: Never    Smokeless tobacco: Never   Substance and Sexual Activity    Alcohol use: Not Currently    Drug use: Never     "

## 2025-05-06 ENCOUNTER — OFFICE VISIT (OUTPATIENT)
Dept: VASCULAR SURGERY | Facility: CLINIC | Age: 70
End: 2025-05-06
Payer: MEDICARE

## 2025-05-06 VITALS
BODY MASS INDEX: 22.88 KG/M2 | SYSTOLIC BLOOD PRESSURE: 134 MMHG | HEIGHT: 64 IN | WEIGHT: 134 LBS | DIASTOLIC BLOOD PRESSURE: 77 MMHG | HEART RATE: 87 BPM

## 2025-05-06 DIAGNOSIS — N18.6 ESRD (END STAGE RENAL DISEASE): Primary | ICD-10-CM

## 2025-05-06 RX ORDER — FOLIC ACID/VIT B COMPLEX AND C 0.8 MG
TABLET ORAL
COMMUNITY

## 2025-05-06 RX ORDER — ELECTROLYTES/DEXTROSE
2 SOLUTION, ORAL ORAL
COMMUNITY
Start: 2025-04-27

## (undated) DEVICE — SOL NACL .9P 500ML

## (undated) DEVICE — SUT PROLENE 6-0 24 BV-1

## (undated) DEVICE — SOL 9P NACL IRR PIC IL

## (undated) DEVICE — CLIP LIGATING HEMOCLP SMALL

## (undated) DEVICE — SUT 4-0 12-18IN SILK BLACK

## (undated) DEVICE — GLOVE PROTEXIS HYDROGEL SZ6.5

## (undated) DEVICE — BAG MEDI-PLAST DECANTER C-FLOW

## (undated) DEVICE — SUT VICRYL 3-0 27 SH

## (undated) DEVICE — SHEATH TUNN GRAFT 12IN 7X6MM

## (undated) DEVICE — SUT MCRYL PLUS 4-0 PS2 27IN

## (undated) DEVICE — DRAPE INCISE IOBAN 2 13X13IN

## (undated) DEVICE — CLIP LIGATING MEDIUM

## (undated) DEVICE — ADHESIVE DERMABOND ADVANCED

## (undated) DEVICE — NDL HYPO REG 25G X 1 1/2

## (undated) DEVICE — SUT 3-0 12-18IN SILK

## (undated) DEVICE — SYR 30CC LUER LOCK

## (undated) DEVICE — ELECTRODE PATIENT RETURN DISP

## (undated) DEVICE — GLOVE PROTEXIS LTX MICRO 6

## (undated) DEVICE — SUT 2-0 12-18IN SILK

## (undated) DEVICE — BOWL UTILITY BLUE 32OZ

## (undated) DEVICE — SEE MEDLINE ITEM 157196

## (undated) DEVICE — GLOVE PROTEXIS LTX MICRO 6.5

## (undated) DEVICE — COVER PROBE US 5.5X58L NON LTX

## (undated) DEVICE — Device

## (undated) DEVICE — SYS CLSR DERMABOND PRINEO 22CM

## (undated) DEVICE — PAD PREP CUFFED NS 24X48IN

## (undated) DEVICE — NDL SYR 10ML 18X1.5 LL BLUNT